# Patient Record
Sex: MALE | Race: WHITE | HISPANIC OR LATINO | Employment: FULL TIME | URBAN - METROPOLITAN AREA
[De-identification: names, ages, dates, MRNs, and addresses within clinical notes are randomized per-mention and may not be internally consistent; named-entity substitution may affect disease eponyms.]

---

## 2017-09-27 ENCOUNTER — OFFICE VISIT (OUTPATIENT)
Dept: MEDICAL GROUP | Facility: MEDICAL CENTER | Age: 32
End: 2017-09-27
Payer: COMMERCIAL

## 2017-09-27 VITALS
RESPIRATION RATE: 16 BRPM | TEMPERATURE: 98.3 F | BODY MASS INDEX: 28.14 KG/M2 | HEIGHT: 69 IN | SYSTOLIC BLOOD PRESSURE: 130 MMHG | DIASTOLIC BLOOD PRESSURE: 80 MMHG | OXYGEN SATURATION: 97 % | WEIGHT: 190 LBS | HEART RATE: 81 BPM

## 2017-09-27 DIAGNOSIS — J06.9 VIRAL URI: ICD-10-CM

## 2017-09-27 DIAGNOSIS — Z23 NEED FOR MMR VACCINE: ICD-10-CM

## 2017-09-27 DIAGNOSIS — Z00.00 ANNUAL PHYSICAL EXAM: ICD-10-CM

## 2017-09-27 PROCEDURE — 90707 MMR VACCINE SC: CPT | Performed by: NURSE PRACTITIONER

## 2017-09-27 PROCEDURE — 99999 PR NO CHARGE: CPT | Performed by: NURSE PRACTITIONER

## 2017-09-27 PROCEDURE — 90471 IMMUNIZATION ADMIN: CPT | Performed by: NURSE PRACTITIONER

## 2017-09-27 PROCEDURE — 99385 PREV VISIT NEW AGE 18-39: CPT | Mod: 25 | Performed by: NURSE PRACTITIONER

## 2017-09-27 RX ORDER — LORATADINE 10 MG/1
10 TABLET ORAL DAILY
Qty: 30 TAB | Refills: 3 | Status: SHIPPED | OUTPATIENT
Start: 2017-09-27

## 2017-09-27 RX ORDER — FLUTICASONE PROPIONATE 50 MCG
1 SPRAY, SUSPENSION (ML) NASAL DAILY
Qty: 16 G | Refills: 2 | Status: SHIPPED | OUTPATIENT
Start: 2017-09-27

## 2017-09-27 NOTE — LETTER
[facility]  RAY Low  [address]  [place] [address]  Fax: [phone]   Authorization for Release/Disclosure of   Protected Health Information   Name: MARIELENA SANDERS : 1985 SSN: [id number]   Address: [address] [place] [id number] Phone:    [phone] (home)    I authorize the entity listed below to release/disclose the PHI below to:   [facility]/RAY Low and RAY Low   Provider or Entity Name:     Address   City, [facility], [facility]  [address] [place] [address]    Phone:  [phone]      Fax:  [phone]      Reason for request: continuity of care   Information to be released:    [  ] LAST COLONOSCOPY,  including any PATH REPORT and follow-up  [  ] LAST FIT/COLOGUARD RESULT [  ] LAST DEXA  [  ] LAST MAMMOGRAM  [  ] LAST PAP  [XX] LAST LABS [  ] RETINA EXAM REPORT  [XX] IMMUNIZATION RECORDS  [XX] Release all info      [  ] Check here and initial the line next to each item to release ALL health information INCLUDING  _____ Care and treatment for drug and / or alcohol abuse  _____ HIV testing, infection status, or AIDS  _____ Genetic Testing    DATES OF SERVICE OR TIME PERIOD TO BE DISCLOSED: _____________  I understand and acknowledge that:  * This Authorization may be revoked at any time by you in writing, except if your health information has already been used or disclosed.  * Your health information that will be used or disclosed as a result of you signing this authorization could be re-disclosed by the recipient. If this occurs, your re-disclosed health information may no longer be protected by State or Federal laws.  * You may refuse to sign this Authorization. Your refusal will not affect your ability to obtain treatment.  * This Authorization becomes effective upon signing and will  on (date) __________.      If no date is indicated, this Authorization will  one (1) year from the  signature date.    Name: Fedenirmala Zepeda    Signature:   Date:     9/27/2017       PLEASE FAX REQUESTED RECORDS BACK TO: [phone]

## 2017-09-28 NOTE — ASSESSMENT & PLAN NOTE
Last time being seen by a provider was 2007 when he had his appendectomy. He also recently had lab work done for immigration. He is requesting an MMR vaccination today for immigration as well. He eats fast food 3 times per week, eats mostly meat, rice, beans, tortillas, vegetables only 1-2 times per week, he does not do any physical activity currently, he used to play soccer. He currently has a cold, denies any fevers, chills.

## 2017-09-28 NOTE — PROGRESS NOTES
Min Zepeda is a 32 y.o. male here for to establish care and to receive the MMR vaccination.    HPI:    Annual physical exam  Last time being seen by a provider was 2007 when he had his appendectomy. He also recently had lab work done for immigration. He is requesting an MMR vaccination today for immigration as well. He eats fast food 3 times per week, eats mostly meat, rice, beans, tortillas, vegetables only 1-2 times per week, he does not do any physical activity currently, he used to play soccer. He currently has a cold, denies any fevers, chills.     Viral URI  Symptoms started yesterday, include sinus congestion, cough, sneezing. Denies fevers, chills. Has been taking OTC ibuprofen 400mg twice daily for symptoms.     Current medicines (including changes today)  Current Outpatient Prescriptions   Medication Sig Dispense Refill   • fluticasone (FLONASE) 50 MCG/ACT nasal spray Spray 1 Spray in nose every day. 16 g 2   • loratadine (CLARITIN) 10 MG Tab Take 1 Tab by mouth every day. 30 Tab 3     No current facility-administered medications for this visit.      He  has no past medical history on file.  He  has a past surgical history that includes appendectomy (12/2006).  Social History   Substance Use Topics   • Smoking status: Former Smoker     Packs/day: 0.25     Years: 6.00     Types: Cigarettes     Quit date: 9/27/2006   • Smokeless tobacco: Never Used   • Alcohol use 4.2 oz/week     7 Cans of beer per week      Comment: 7/week     Social History     Social History Narrative   • No narrative on file     Family History   Problem Relation Age of Onset   • Hyperlipidemia Father    • Kidney Disease Brother      s/p transplant     Family Status   Relation Status   • Mother Alive   • Father Alive   • Sister Alive   • Brother Alive         ROS  No chest pain, no abdominal pain, no rash, no fever  All other systems reviewed and are negative      Objective:     Blood pressure 130/80, pulse 81,  "temperature 36.8 °C (98.3 °F), resp. rate 16, height 1.753 m (5' 9\"), weight 86.2 kg (190 lb), SpO2 97 %. Body mass index is 28.06 kg/m².  Physical Exam:    Constitutional: Alert, no distress.  Skin: Warm, dry, good turgor, no rashes in visible areas.  Eye: Equal, round and reactive, conjunctiva clear, lids normal.  ENMT: Lips without lesions, good dentition, +Pharyngeal erythema. + Erythema and edema of nasal turbinates bilaterally with clear drainage present.  Neck: Trachea midline, no masses, no thyromegaly. No cervical or supraclavicular lymphadenopathy.  Respiratory: Unlabored respiratory effort, lungs clear to auscultation, no wheezes, no ronchi.  Cardiovascular: Normal S1, S2, no murmur, no edema.  Abdomen: Soft, non-tender, no masses, no hepatosplenomegaly.  Psych: Alert and oriented x3, normal affect and mood.        Assessment and Plan:   The following treatment plan was discussed    1. Annual physical exam  Healthy-appearing 32-year-old male. Has not seen a provider since 2007 when he had his appendectomy. He adheres to a poor diet and is not physically active. Patient and I discussed the importance of lifestyle changes, with particular emphasis on increasing plant-based nutrition (for the purposes of weight loss, general health), as well as cardiovascular exercise, proper sleep, and stress management.  Patient verbalized understanding.     The patient has a needle phobia. He recently had labs done for immigration, we will try to get these records. I have ordered the labs below and will notify patient if these are still needed once we receive records.    Encouraged patient to continue following up to prevent development of chronic diseases in the future, also encouraged him to get flu shot however he declined.    - CBC WITH DIFFERENTIAL; Future  - COMP METABOLIC PANEL; Future  - HEMOGLOBIN A1C; Future  - LIPID PROFILE; Future  - TSH WITH REFLEX TO FT4; Future    2. Viral URI  Unstable.   Stop Advil. Start " Flonase and Claritin daily.  - fluticasone (FLONASE) 50 MCG/ACT nasal spray; Spray 1 Spray in nose every day.  Dispense: 16 g; Refill: 2  - loratadine (CLARITIN) 10 MG Tab; Take 1 Tab by mouth every day.  Dispense: 30 Tab; Refill: 3    Treatments advised today in addition to orders above  include: Nasal decongestant, sinus rinse or nasal saline, OTC cough/cold product of patient's choice PRN, OTC antihistamines PRN, prescription for symptomatic treatment as written and fluids and rest    3. Need for MMR vaccine  Needed for immigration.  Vaccination given in office.  - MMR VACCINE SQ    Followup for worsening symptoms, difficulty breathing, lack of expected recovery, or should new symptoms or problems arise.      Records requested.  Followup: Return in about 3 months (around 12/27/2017), or if symptoms worsen or fail to improve, for Short.    I have placed the below orders and discussed them with an approved delegating provider. The MA is performing the below orders under the direction of Dr. Gilbert

## 2017-09-28 NOTE — ASSESSMENT & PLAN NOTE
Symptoms started yesterday, include sinus congestion, cough, sneezing. Denies fevers, chills. Has been taking OTC ibuprofen 400mg twice daily for symptoms.

## 2017-09-29 NOTE — PROGRESS NOTES
I have reviewed note from JAHAIRA Melissa and I agree with assessment and plan.  Rafy Gilbert M.D.

## 2018-06-26 ENCOUNTER — OFFICE VISIT (OUTPATIENT)
Dept: MEDICAL GROUP | Facility: MEDICAL CENTER | Age: 33
End: 2018-06-26
Payer: COMMERCIAL

## 2018-06-26 VITALS
HEIGHT: 69 IN | WEIGHT: 190 LBS | BODY MASS INDEX: 28.14 KG/M2 | HEART RATE: 89 BPM | DIASTOLIC BLOOD PRESSURE: 80 MMHG | TEMPERATURE: 99 F | SYSTOLIC BLOOD PRESSURE: 118 MMHG | OXYGEN SATURATION: 96 %

## 2018-06-26 DIAGNOSIS — R21 RASH: ICD-10-CM

## 2018-06-26 DIAGNOSIS — K22.89 ESOPHAGEAL PAIN: ICD-10-CM

## 2018-06-26 DIAGNOSIS — K21.00 GASTROESOPHAGEAL REFLUX DISEASE WITH ESOPHAGITIS: ICD-10-CM

## 2018-06-26 DIAGNOSIS — R13.19 OTHER DYSPHAGIA: ICD-10-CM

## 2018-06-26 PROCEDURE — 99214 OFFICE O/P EST MOD 30 MIN: CPT | Performed by: NURSE PRACTITIONER

## 2018-06-26 RX ORDER — SUCRALFATE 1 G/1
1 TABLET ORAL
Qty: 120 TAB | Refills: 0 | Status: SHIPPED | OUTPATIENT
Start: 2018-06-26

## 2018-06-26 RX ORDER — OMEPRAZOLE 20 MG/1
20 CAPSULE, DELAYED RELEASE ORAL DAILY
Qty: 30 CAP | Refills: 0 | Status: SHIPPED | OUTPATIENT
Start: 2018-06-26

## 2018-06-26 ASSESSMENT — PATIENT HEALTH QUESTIONNAIRE - PHQ9: CLINICAL INTERPRETATION OF PHQ2 SCORE: 0

## 2018-06-27 NOTE — PROGRESS NOTES
Subjective:     Min Zepeda is a 33 y.o. male who presents with penile rash.    HPI:   Seen in f/u for red rash on pubic area. He had red itching bumps.  Used otc lotrimin with complete resolution.    He is having difficulty swallowing.  He feels that he has food getting stuck in his throat.  He also has heart burn.  He was eating last week and felt his food getting stuck.  He also has ot drink and eat slowly or it will cause pain in epigastric area, esophagus and throat.  Has had to stop acidic foods.  No black tarry stools.      Patient Active Problem List    Diagnosis Date Noted   • Annual physical exam 09/27/2017   • Viral URI 09/27/2017       Current medicines (including changes today)  Current Outpatient Prescriptions   Medication Sig Dispense Refill   • omeprazole (PRILOSEC) 20 MG delayed-release capsule Take 1 Cap by mouth every day. 30 Cap 0   • sucralfate (CARAFATE) 1 GM Tab Take 1 Tab by mouth 4 Times a Day,Before Meals and at Bedtime. 120 Tab 0   • fluticasone (FLONASE) 50 MCG/ACT nasal spray Spray 1 Spray in nose every day. 16 g 2   • loratadine (CLARITIN) 10 MG Tab Take 1 Tab by mouth every day. 30 Tab 3     No current facility-administered medications for this visit.        No Known Allergies    ROS  Constitutional: Negative. Negative for fever, chills, weight loss, malaise/fatigue and diaphoresis.   HENT: Negative. Negative for hearing loss, ear pain, nosebleeds, congestion, sore throat, neck pain, tinnitus and ear discharge.   Respiratory: Negative. Negative for cough, hemoptysis, sputum production, shortness of breath, wheezing and stridor.   Cardiovascular: Negative. Negative for chest pain, palpitations, orthopnea, claudication, leg swelling and PND.   Gastrointestinal: Denies nausea, vomiting, diarrhea, constipation, heartburn, melena or hematochezia.  Genitourinary: Denies dysuria, hematuria, urinary incontinence, frequency or urgency.        Objective:     Blood pressure  "118/80, pulse 89, temperature 37.2 °C (99 °F), height 1.753 m (5' 9\"), weight 86.2 kg (190 lb), SpO2 96 %. Body mass index is 28.06 kg/m².    Physical Exam:  Physical Exam   Vitals reviewed.  Constitutional: oriented to person, place, and time. appears well-developed and well-nourished. No distress.   Cardiovascular: Normal rate, regular rhythm, normal heart sounds and intact distal pulses.  Exam reveals no gallop and no friction rub.  No murmur heard.  No carotid bruits.   Pulmonary/Chest: Effort normal and breath sounds normal. No stridor. No respiratory distress. no wheezes or rales. exhibits no tenderness.   Musculoskeletal: Normal range of motion. exhibits no edema. marilee pedal pulses 2+.  Lymphadenopathy: no cervical or supraclavicular adenopathy.   Abd:  No CVAT,  Soft,  Bs noted in all quadrants.  No HSM.  No abdominal tenderness.  Penile exam wnl.  No rash noted  Neurological: alert and oriented to person, place, and time. exhibits normal muscle tone. Coordination normal.   Skin: Skin is warm and dry. no diaphoresis.   Psychiatric: normal mood and affect. behavior is normal.           Assessment and Plan:     The following treatment plan was discussed:    1. Gastroesophageal reflux disease with esophagitis  omeprazole (PRILOSEC) 20 MG delayed-release capsule    sucralfate (CARAFATE) 1 GM Tab    REFERRAL TO GASTROENTEROLOGY    H. PYLORI, UREA BREATH TEST, ADULT    use prilosec and carafate daily x 30 days.  check h pylori.  f/u with pt with results.  refer to GI - to see them if not improved   2. Esophageal pain  omeprazole (PRILOSEC) 20 MG delayed-release capsule    sucralfate (CARAFATE) 1 GM Tab    REFERRAL TO GASTROENTEROLOGY    H. PYLORI, UREA BREATH TEST, ADULT    chew food well and eat slowly.    3. Other dysphagia  omeprazole (PRILOSEC) 20 MG delayed-release capsule    sucralfate (CARAFATE) 1 GM Tab    REFERRAL TO GASTROENTEROLOGY    H. PYLORI, UREA BREATH TEST, ADULT   4. Rash      resolved with " lotrimin         Followup: Return if symptoms worsen or fail to improve.

## 2020-07-07 ENCOUNTER — TELEPHONE (OUTPATIENT)
Dept: MEDICAL GROUP | Facility: MEDICAL CENTER | Age: 35
End: 2020-07-07

## 2020-07-07 DIAGNOSIS — Z11.59 SPECIAL SCREENING EXAMINATION FOR VIRAL DISEASE: ICD-10-CM

## 2020-07-14 ENCOUNTER — APPOINTMENT (OUTPATIENT)
Dept: LAB | Facility: MEDICAL CENTER | Age: 35
End: 2020-07-14
Attending: NURSE PRACTITIONER
Payer: COMMERCIAL

## 2021-04-06 ENCOUNTER — IMMUNIZATION (OUTPATIENT)
Dept: FAMILY PLANNING/WOMEN'S HEALTH CLINIC | Facility: IMMUNIZATION CENTER | Age: 36
End: 2021-04-06
Payer: COMMERCIAL

## 2021-04-06 DIAGNOSIS — Z23 ENCOUNTER FOR VACCINATION: Primary | ICD-10-CM

## 2021-04-06 PROCEDURE — 91300 PFIZER SARS-COV-2 VACCINE: CPT

## 2021-04-06 PROCEDURE — 0001A PFIZER SARS-COV-2 VACCINE: CPT

## 2021-04-29 ENCOUNTER — IMMUNIZATION (OUTPATIENT)
Dept: FAMILY PLANNING/WOMEN'S HEALTH CLINIC | Facility: IMMUNIZATION CENTER | Age: 36
End: 2021-04-29
Attending: INTERNAL MEDICINE
Payer: COMMERCIAL

## 2021-04-29 DIAGNOSIS — Z23 ENCOUNTER FOR VACCINATION: Primary | ICD-10-CM

## 2021-04-29 PROCEDURE — 91300 PFIZER SARS-COV-2 VACCINE: CPT

## 2021-04-29 PROCEDURE — 0002A PFIZER SARS-COV-2 VACCINE: CPT

## 2023-04-23 ENCOUNTER — HOSPITAL ENCOUNTER (EMERGENCY)
Facility: MEDICAL CENTER | Age: 38
End: 2023-04-23
Attending: EMERGENCY MEDICINE
Payer: COMMERCIAL

## 2023-04-23 ENCOUNTER — APPOINTMENT (OUTPATIENT)
Dept: RADIOLOGY | Facility: MEDICAL CENTER | Age: 38
End: 2023-04-23
Attending: EMERGENCY MEDICINE
Payer: COMMERCIAL

## 2023-04-23 VITALS
HEIGHT: 67 IN | BODY MASS INDEX: 30.61 KG/M2 | SYSTOLIC BLOOD PRESSURE: 141 MMHG | DIASTOLIC BLOOD PRESSURE: 83 MMHG | TEMPERATURE: 98 F | WEIGHT: 195 LBS | HEART RATE: 100 BPM | OXYGEN SATURATION: 95 % | RESPIRATION RATE: 16 BRPM

## 2023-04-23 DIAGNOSIS — S82.202A TIBIA/FIBULA FRACTURE, LEFT, CLOSED, INITIAL ENCOUNTER: ICD-10-CM

## 2023-04-23 DIAGNOSIS — S82.402A TIBIA/FIBULA FRACTURE, LEFT, CLOSED, INITIAL ENCOUNTER: ICD-10-CM

## 2023-04-23 DIAGNOSIS — S82.892A CLOSED FRACTURE OF LEFT ANKLE, INITIAL ENCOUNTER: ICD-10-CM

## 2023-04-23 PROCEDURE — 73590 X-RAY EXAM OF LOWER LEG: CPT | Mod: LT

## 2023-04-23 PROCEDURE — 700102 HCHG RX REV CODE 250 W/ 637 OVERRIDE(OP): Performed by: EMERGENCY MEDICINE

## 2023-04-23 PROCEDURE — A9270 NON-COVERED ITEM OR SERVICE: HCPCS | Performed by: EMERGENCY MEDICINE

## 2023-04-23 PROCEDURE — 73610 X-RAY EXAM OF ANKLE: CPT | Mod: LT

## 2023-04-23 PROCEDURE — 99284 EMERGENCY DEPT VISIT MOD MDM: CPT

## 2023-04-23 RX ORDER — OXYCODONE HYDROCHLORIDE AND ACETAMINOPHEN 5; 325 MG/1; MG/1
2 TABLET ORAL ONCE
Status: COMPLETED | OUTPATIENT
Start: 2023-04-23 | End: 2023-04-23

## 2023-04-23 RX ORDER — OXYCODONE HYDROCHLORIDE AND ACETAMINOPHEN 5; 325 MG/1; MG/1
1 TABLET ORAL EVERY 6 HOURS PRN
Qty: 15 TABLET | Refills: 0 | Status: SHIPPED | OUTPATIENT
Start: 2023-04-23 | End: 2023-04-28

## 2023-04-23 RX ADMIN — OXYCODONE AND ACETAMINOPHEN 2 TABLET: 5; 325 TABLET ORAL at 14:27

## 2023-04-23 NOTE — ED PROVIDER NOTES
"ED Provider Note    CHIEF COMPLAINT  Chief Complaint   Patient presents with    Leg Pain     Pt slipped and fell during a soccer game and heard a \"pop\" in left leg. Pt complaining of pain to left calf. Pt having swelling in left ankle as well. CMS intact.        EXTERNAL RECORDS REVIEWED  None    HPI/ROS  LIMITATION TO HISTORY   None  OUTSIDE HISTORIAN(S):  Wife provided additional history    Min Zepeda is a 37 y.o. male who presents for evaluation of acute left lower extremity injury.  The patient was playing soccer, he had an abnormal fall and felt a popping sensation in his left leg.  He experienced severe 10 out of 10 pain in his entire left leg.  No injury to the head neck chest abdomen or pelvis.  Patient is an otherwise healthy 37-year-old.  Only surgical history includes appendectomy.  Injury occurred within the last hour.  He was unable to bear weight.  No numbness weakness or tingling.    PAST MEDICAL HISTORY       SURGICAL HISTORY   has a past surgical history that includes appendectomy (2006).    FAMILY HISTORY  Family History   Problem Relation Age of Onset    Hyperlipidemia Father     Kidney Disease Brother         s/p transplant       SOCIAL HISTORY  Social History     Tobacco Use    Smoking status: Former     Packs/day: 0.25     Years: 6.00     Pack years: 1.50     Types: Cigarettes     Quit date: 2006     Years since quittin.5    Smokeless tobacco: Never   Substance and Sexual Activity    Alcohol use: Yes     Alcohol/week: 4.2 oz     Types: 7 Cans of beer per week     Comment: 7/week    Drug use: No     Comment: Past methamphetamine use for 7 months-quit     Sexual activity: Yes     Partners: Female     Birth control/protection: I.U.D.       CURRENT MEDICATIONS  Home Medications       Reviewed by Alcira Jarquin R.N. (Registered Nurse) on 23 at 1354  Med List Status: Not Addressed     Medication Last Dose Status   fluticasone (FLONASE) 50 MCG/ACT nasal " "spray  Active   loratadine (CLARITIN) 10 MG Tab  Active   omeprazole (PRILOSEC) 20 MG delayed-release capsule  Active   sucralfate (CARAFATE) 1 GM Tab  Active                    ALLERGIES  No Known Allergies    PHYSICAL EXAM  VITAL SIGNS: /86   Pulse (!) 106   Temp 36.5 °C (97.7 °F) (Temporal)   Resp 16   Ht 1.702 m (5' 7\")   Wt 88.5 kg (195 lb)   SpO2 98%   BMI 30.54 kg/m²    Pulse ox interpretation: I interpret this pulse ox as normal.  Constitutional: Alert and oriented x 3, moderate distress  HEENT: Atraumatic normocephalic, pupils are equal round reactive to light extraocular movements are intact. The nares is clear, external ears are normal, mouth shows moist mucous membranes normal dentition for age  Neck: Supple, no JVD no tracheal deviation  Cardiovascular: Regular rate and rhythm no murmur rub or gallop 2+ pulses peripherally x4  Thorax & Lungs: No respiratory distress, no wheezes rales or rhonchi, No chest tenderness.   GI: Soft nontender nondistended positive bowel sounds, no peritoneal signs  Skin: Warm dry no acute rash or lesion  Musculoskeletal: Left lower extremity exam is notable for tenderness noted over the lateral aspect of the leg.  Anterior and lateral compartments appear to be soft.  There is diffuse circumferential swelling in the ankle with particular tenderness overlying the posterior malleolus.  No midfoot instability or tenderness.  Neurovascular exam of the foot is normal.  Capillary refill is normal dorsalis pedal pulses 2+.    Neurologic: Cranial nerves III through XII are grossly intact no sensory deficit no cerebellar dysfunction   Psychiatric: Appropriate affect for situation at this time          DIAGNOSTIC STUDIES / PROCEDURES    RADIOLOGY  I have independently interpreted the diagnostic imaging associated with this visit and am waiting the final reading from the radiologist.   My preliminary interpretation is as follows: Posterior malleoli are fracture with " obliquely slightly displaced proximal fibular fracture  Radiologist interpretation:   DX-ANKLE 3+ VIEWS LEFT   Final Result      Posterior malleolar fracture, only seen on the lateral view. No medial or lateral malleolar fractures.      DX-TIBIA AND FIBULA LEFT   Final Result      1.  Obliquely oriented proximal fibular diaphyseal fracture.   2.  Posterior malleolar fracture, as seen on the ankle radiograph.          COURSE & MEDICAL DECISION MAKING    ED Observation Status? Yes; I am placing the patient in to an observation status due to a diagnostic uncertainty as well as therapeutic intensity. Patient placed in observation status at 1:26 PM, 4/23/2023.     Observation plan is as follows: Administer pain medication perform diagnostic radiographs.  Perform serial neurovascular exams and compartment exams of the left lower extremity.  Discussed plan of care with orthopedics    Upon Reevaluation, the patient's condition has: Improved; and will be discharged.    Patient discharged from ED Observation status at 1530 (Time) 4/23/23 (Date).     INITIAL ASSESSMENT, COURSE AND PLAN  Care Narrative:    This is a very pleasant 37-year-old gentleman who presents here after an acute injury to the left lower extremity.  He was given oral Percocet for pain.  I was concerned about possible fractures and subsequent posterior malleolus fracture and proximal spiral fibular fracture were diagnosed today.  I performed serial compartment and neurovascular exams and there is no evidence of compartment syndrome.  His pain was improved after treatment with Percocet.  Reviewed the case and plan of care with Dr. Meyer with orthopedics.  He feels that a tall orthopedic boot and crutches with nonweightbearing status with urgent follow-up as clinically indicated.  I will provide the patient a brief prescription for opiates.  Reviewed his profile in the  website and there is no history of opioid abuse or addiction.      ADDITIONAL PROBLEM  LIST    DISPOSITION AND DISCUSSIONS  I have discussed management of the patient with the following physicians and WILMER's: Discussed plan of care with orthopedic attending    Discussion of management with other QHP or appropriate source(s): None    Escalation of care considered, and ultimately not performed:IV fluids and blood analysis    Barriers to care at this time, including but not limited to: Patient does not have established PCP.     Decision tools and prescription drugs considered including, but not limited to: Patient will be prescribed brief opioid therapy    FINAL DIAGNOSIS  1. Closed fracture of left ankle, initial encounter  oxyCODONE-acetaminophen (PERCOCET) 5-325 MG Tab      2. Tibia/fibula fracture, left, closed, initial encounter                 Electronically signed by: Tejinder Gates M.D., 4/23/2023 2:52 PM

## 2023-04-23 NOTE — ED TRIAGE NOTES
"Chief Complaint   Patient presents with    Leg Pain     Pt slipped and fell during a soccer game and heard a \"pop\" in left leg. Pt complaining of pain to left calf. Pt having swelling in left ankle as well. CMS intact.        Swelling noted to left lower leg. Pt states he feels like when he pushes on the outside of his calf he can feel the bone moving.     /86   Pulse (!) 106   Temp 36.5 °C (97.7 °F) (Temporal)   Resp 16   Ht 1.702 m (5' 7\")   Wt 88.5 kg (195 lb)   SpO2 98%   BMI 30.54 kg/m²     "

## 2023-04-23 NOTE — ED NOTES
Discharge paperwork given to pt, all questions answered, all belongings accounted for, pt ambulated with aid of crutches to ER exit

## 2023-04-24 NOTE — H&P (VIEW-ONLY)
Subjective   Patient ID:  Min Zepeda is a 37 y.o. male.    Chief Complaint:  Fracture of the Left Ankle    Last Surgery: No surgery found on No surgery found    HPI Min is a pleasant 37-year-old gentleman who was playing soccer and sustained a twisting injury to his left ankle.  He presented to [facility] and has a [place] as well as a posterior malleolar fracture.  He is here for consideration of surgical management.  Denies numbness or tingling.  He works in construction.    Review of Systems negative    Objective   Ortho Exam  Alert and oriented no apparent distress.  Chest breathing comfortably, heart regular rate and rhythm.  Very pleasant conversation.  Circumferential moderate to severe swelling around his left ankle.  Neurovascularly intact throughout.  Skin is intact.    Last Imaging Result(s):   Ankle and tib-fib x-rays from [facility] show a proximal fibula fracture without significant shortening, long oblique.  Posterior malleolar fracture with some displacement.  I do not see a definite osteochondral defect.    Assessment & Plan   Encounter Diagnoses:   Closed fracture of ankle, trimalleolar, left, initial encounter    Ankle syndesmosis disruption, left, initial encounter    Orders Placed This Encounter    Surgical Case Request: ORIF, ANKLE     Min is a pleasant 37-year-old gentleman with a trimalleolar equivalent ankle fracture with a posterior malleolar fragment, deltoid disruption, proximal fibula fracture.  He is indicated for open reduction internal fixation as well as fixation of his syndesmosis.  We will arthroscopically evaluate and debride the joint as well as use arthroscope to assist in reduction of the syndesmosis.  I like him to be touchdown weightbearing only until surgery.  I like him to wait for about a week for soft tissue rest and I have discussed this with him and his wife.  He will be weightbearing as tolerated in a boot postoperatively.  I filled out a  scheduling form and look forward to seeing him on a scheduled date.  Return for Post-Op, Imaging.

## 2023-04-25 PROBLEM — S82.852D TRIMALLEOLAR FRACTURE OF ANKLE, CLOSED, LEFT, WITH ROUTINE HEALING, SUBSEQUENT ENCOUNTER: Status: ACTIVE | Noted: 2023-04-25

## 2023-05-01 ENCOUNTER — PRE-ADMISSION TESTING (OUTPATIENT)
Dept: ADMISSIONS | Facility: MEDICAL CENTER | Age: 38
End: 2023-05-01
Attending: ORTHOPAEDIC SURGERY
Payer: COMMERCIAL

## 2023-05-01 RX ORDER — OXYCODONE AND ACETAMINOPHEN 10; 325 MG/1; MG/1
1 TABLET ORAL EVERY 4 HOURS PRN
COMMUNITY

## 2023-05-02 ENCOUNTER — ANESTHESIA EVENT (OUTPATIENT)
Dept: SURGERY | Facility: MEDICAL CENTER | Age: 38
End: 2023-05-02
Payer: COMMERCIAL

## 2023-05-02 ENCOUNTER — ANESTHESIA (OUTPATIENT)
Dept: SURGERY | Facility: MEDICAL CENTER | Age: 38
End: 2023-05-02
Payer: COMMERCIAL

## 2023-05-02 ENCOUNTER — HOSPITAL ENCOUNTER (OUTPATIENT)
Facility: MEDICAL CENTER | Age: 38
End: 2023-05-02
Attending: ORTHOPAEDIC SURGERY | Admitting: ORTHOPAEDIC SURGERY
Payer: COMMERCIAL

## 2023-05-02 ENCOUNTER — APPOINTMENT (OUTPATIENT)
Dept: RADIOLOGY | Facility: MEDICAL CENTER | Age: 38
End: 2023-05-02
Attending: ORTHOPAEDIC SURGERY
Payer: COMMERCIAL

## 2023-05-02 VITALS
SYSTOLIC BLOOD PRESSURE: 110 MMHG | TEMPERATURE: 97.2 F | OXYGEN SATURATION: 93 % | DIASTOLIC BLOOD PRESSURE: 68 MMHG | BODY MASS INDEX: 28.91 KG/M2 | RESPIRATION RATE: 18 BRPM | HEIGHT: 70 IN | HEART RATE: 80 BPM | WEIGHT: 201.94 LBS

## 2023-05-02 DIAGNOSIS — S82.852D TRIMALLEOLAR FRACTURE OF ANKLE, CLOSED, LEFT, WITH ROUTINE HEALING, SUBSEQUENT ENCOUNTER: ICD-10-CM

## 2023-05-02 PROCEDURE — 160035 HCHG PACU - 1ST 60 MINS PHASE I: Performed by: ORTHOPAEDIC SURGERY

## 2023-05-02 PROCEDURE — 700101 HCHG RX REV CODE 250: Performed by: ANESTHESIOLOGY

## 2023-05-02 PROCEDURE — 01480 ANES OPEN PX LOWER L/A/F NOS: CPT | Performed by: ANESTHESIOLOGY

## 2023-05-02 PROCEDURE — 64445 NJX AA&/STRD SCIATIC NRV IMG: CPT | Performed by: ORTHOPAEDIC SURGERY

## 2023-05-02 PROCEDURE — 160029 HCHG SURGERY MINUTES - 1ST 30 MINS LEVEL 4: Performed by: ORTHOPAEDIC SURGERY

## 2023-05-02 PROCEDURE — 700105 HCHG RX REV CODE 258: Performed by: ANESTHESIOLOGY

## 2023-05-02 PROCEDURE — 64447 NJX AA&/STRD FEMORAL NRV IMG: CPT | Mod: 59,LT | Performed by: ANESTHESIOLOGY

## 2023-05-02 PROCEDURE — C1713 ANCHOR/SCREW BN/BN,TIS/BN: HCPCS | Performed by: ORTHOPAEDIC SURGERY

## 2023-05-02 PROCEDURE — 502000 HCHG MISC OR IMPLANTS RC 0278: Performed by: ORTHOPAEDIC SURGERY

## 2023-05-02 PROCEDURE — 160046 HCHG PACU - 1ST 60 MINS PHASE II: Performed by: ORTHOPAEDIC SURGERY

## 2023-05-02 PROCEDURE — 27822 TREATMENT OF ANKLE FRACTURE: CPT | Mod: LT | Performed by: ORTHOPAEDIC SURGERY

## 2023-05-02 PROCEDURE — 700101 HCHG RX REV CODE 250: Performed by: ORTHOPAEDIC SURGERY

## 2023-05-02 PROCEDURE — 27829 TREAT LOWER LEG JOINT: CPT | Mod: LT | Performed by: ORTHOPAEDIC SURGERY

## 2023-05-02 PROCEDURE — 64445 NJX AA&/STRD SCIATIC NRV IMG: CPT | Mod: 59,LT | Performed by: ANESTHESIOLOGY

## 2023-05-02 PROCEDURE — 29898 ANKLE ARTHROSCOPY/SURGERY: CPT | Mod: LT | Performed by: ORTHOPAEDIC SURGERY

## 2023-05-02 PROCEDURE — 27822 TREATMENT OF ANKLE FRACTURE: CPT | Mod: ASROC,LT | Performed by: NURSE PRACTITIONER

## 2023-05-02 PROCEDURE — 160002 HCHG RECOVERY MINUTES (STAT): Performed by: ORTHOPAEDIC SURGERY

## 2023-05-02 PROCEDURE — 73590 X-RAY EXAM OF LOWER LEG: CPT | Mod: LT

## 2023-05-02 PROCEDURE — 64447 NJX AA&/STRD FEMORAL NRV IMG: CPT | Performed by: ORTHOPAEDIC SURGERY

## 2023-05-02 PROCEDURE — 700111 HCHG RX REV CODE 636 W/ 250 OVERRIDE (IP): Performed by: ORTHOPAEDIC SURGERY

## 2023-05-02 PROCEDURE — 160009 HCHG ANES TIME/MIN: Performed by: ORTHOPAEDIC SURGERY

## 2023-05-02 PROCEDURE — 160041 HCHG SURGERY MINUTES - EA ADDL 1 MIN LEVEL 4: Performed by: ORTHOPAEDIC SURGERY

## 2023-05-02 PROCEDURE — 29891 ARTHR ANK OSTCHN DF TAL&/TIB: CPT | Mod: ASROC,LT | Performed by: NURSE PRACTITIONER

## 2023-05-02 PROCEDURE — 160048 HCHG OR STATISTICAL LEVEL 1-5: Performed by: ORTHOPAEDIC SURGERY

## 2023-05-02 PROCEDURE — 29891 ARTHR ANK OSTCHN DF TAL&/TIB: CPT | Mod: LT | Performed by: ORTHOPAEDIC SURGERY

## 2023-05-02 PROCEDURE — 700105 HCHG RX REV CODE 258: Performed by: ORTHOPAEDIC SURGERY

## 2023-05-02 PROCEDURE — 700111 HCHG RX REV CODE 636 W/ 250 OVERRIDE (IP): Performed by: ANESTHESIOLOGY

## 2023-05-02 PROCEDURE — 27829 TREAT LOWER LEG JOINT: CPT | Mod: ASROC,LT | Performed by: NURSE PRACTITIONER

## 2023-05-02 PROCEDURE — 73610 X-RAY EXAM OF ANKLE: CPT | Mod: LT

## 2023-05-02 PROCEDURE — 160025 RECOVERY II MINUTES (STATS): Performed by: ORTHOPAEDIC SURGERY

## 2023-05-02 PROCEDURE — 29898 ANKLE ARTHROSCOPY/SURGERY: CPT | Mod: ASROC,LT | Performed by: NURSE PRACTITIONER

## 2023-05-02 DEVICE — IMPLANTABLE DEVICE: Type: IMPLANTABLE DEVICE | Site: ANKLE | Status: FUNCTIONAL

## 2023-05-02 RX ORDER — DIPHENHYDRAMINE HYDROCHLORIDE 50 MG/ML
12.5 INJECTION INTRAMUSCULAR; INTRAVENOUS
Status: DISCONTINUED | OUTPATIENT
Start: 2023-05-02 | End: 2023-05-02 | Stop reason: HOSPADM

## 2023-05-02 RX ORDER — OXYCODONE HCL 5 MG/5 ML
10 SOLUTION, ORAL ORAL
Status: DISCONTINUED | OUTPATIENT
Start: 2023-05-02 | End: 2023-05-02 | Stop reason: HOSPADM

## 2023-05-02 RX ORDER — DEXAMETHASONE SODIUM PHOSPHATE 4 MG/ML
INJECTION, SOLUTION INTRA-ARTICULAR; INTRALESIONAL; INTRAMUSCULAR; INTRAVENOUS; SOFT TISSUE PRN
Status: DISCONTINUED | OUTPATIENT
Start: 2023-05-02 | End: 2023-05-02 | Stop reason: SURG

## 2023-05-02 RX ORDER — SODIUM CHLORIDE, SODIUM LACTATE, POTASSIUM CHLORIDE, CALCIUM CHLORIDE 600; 310; 30; 20 MG/100ML; MG/100ML; MG/100ML; MG/100ML
INJECTION, SOLUTION INTRAVENOUS CONTINUOUS
Status: DISCONTINUED | OUTPATIENT
Start: 2023-05-02 | End: 2023-05-02 | Stop reason: HOSPADM

## 2023-05-02 RX ORDER — MIDAZOLAM HYDROCHLORIDE 1 MG/ML
1 INJECTION INTRAMUSCULAR; INTRAVENOUS
Status: DISCONTINUED | OUTPATIENT
Start: 2023-05-02 | End: 2023-05-02 | Stop reason: HOSPADM

## 2023-05-02 RX ORDER — HALOPERIDOL 5 MG/ML
1 INJECTION INTRAMUSCULAR
Status: DISCONTINUED | OUTPATIENT
Start: 2023-05-02 | End: 2023-05-02 | Stop reason: HOSPADM

## 2023-05-02 RX ORDER — MIDAZOLAM HYDROCHLORIDE 1 MG/ML
INJECTION INTRAMUSCULAR; INTRAVENOUS PRN
Status: DISCONTINUED | OUTPATIENT
Start: 2023-05-02 | End: 2023-05-02 | Stop reason: SURG

## 2023-05-02 RX ORDER — HYDRALAZINE HYDROCHLORIDE 20 MG/ML
5 INJECTION INTRAMUSCULAR; INTRAVENOUS
Status: DISCONTINUED | OUTPATIENT
Start: 2023-05-02 | End: 2023-05-02 | Stop reason: HOSPADM

## 2023-05-02 RX ORDER — HYDROMORPHONE HYDROCHLORIDE 1 MG/ML
0.2 INJECTION, SOLUTION INTRAMUSCULAR; INTRAVENOUS; SUBCUTANEOUS
Status: DISCONTINUED | OUTPATIENT
Start: 2023-05-02 | End: 2023-05-02 | Stop reason: HOSPADM

## 2023-05-02 RX ORDER — OXYCODONE HCL 5 MG/5 ML
5 SOLUTION, ORAL ORAL
Status: DISCONTINUED | OUTPATIENT
Start: 2023-05-02 | End: 2023-05-02 | Stop reason: HOSPADM

## 2023-05-02 RX ORDER — LIDOCAINE HYDROCHLORIDE 20 MG/ML
INJECTION, SOLUTION EPIDURAL; INFILTRATION; INTRACAUDAL; PERINEURAL PRN
Status: DISCONTINUED | OUTPATIENT
Start: 2023-05-02 | End: 2023-05-02 | Stop reason: SURG

## 2023-05-02 RX ORDER — MEPERIDINE HYDROCHLORIDE 25 MG/ML
12.5 INJECTION INTRAMUSCULAR; INTRAVENOUS; SUBCUTANEOUS
Status: DISCONTINUED | OUTPATIENT
Start: 2023-05-02 | End: 2023-05-02 | Stop reason: HOSPADM

## 2023-05-02 RX ORDER — CEFAZOLIN SODIUM 1 G/3ML
2 INJECTION, POWDER, FOR SOLUTION INTRAMUSCULAR; INTRAVENOUS ONCE
Status: COMPLETED | OUTPATIENT
Start: 2023-05-02 | End: 2023-05-02

## 2023-05-02 RX ORDER — EPHEDRINE SULFATE 50 MG/ML
INJECTION, SOLUTION INTRAVENOUS PRN
Status: DISCONTINUED | OUTPATIENT
Start: 2023-05-02 | End: 2023-05-02 | Stop reason: SURG

## 2023-05-02 RX ORDER — ONDANSETRON 2 MG/ML
4 INJECTION INTRAMUSCULAR; INTRAVENOUS
Status: DISCONTINUED | OUTPATIENT
Start: 2023-05-02 | End: 2023-05-02 | Stop reason: HOSPADM

## 2023-05-02 RX ORDER — SODIUM CHLORIDE, SODIUM LACTATE, POTASSIUM CHLORIDE, CALCIUM CHLORIDE 600; 310; 30; 20 MG/100ML; MG/100ML; MG/100ML; MG/100ML
INJECTION, SOLUTION INTRAVENOUS
Status: DISCONTINUED | OUTPATIENT
Start: 2023-05-02 | End: 2023-05-02 | Stop reason: SURG

## 2023-05-02 RX ORDER — ONDANSETRON 2 MG/ML
INJECTION INTRAMUSCULAR; INTRAVENOUS PRN
Status: DISCONTINUED | OUTPATIENT
Start: 2023-05-02 | End: 2023-05-02 | Stop reason: SURG

## 2023-05-02 RX ORDER — HYDROMORPHONE HYDROCHLORIDE 1 MG/ML
0.1 INJECTION, SOLUTION INTRAMUSCULAR; INTRAVENOUS; SUBCUTANEOUS
Status: DISCONTINUED | OUTPATIENT
Start: 2023-05-02 | End: 2023-05-02 | Stop reason: HOSPADM

## 2023-05-02 RX ORDER — HYDROMORPHONE HYDROCHLORIDE 1 MG/ML
0.4 INJECTION, SOLUTION INTRAMUSCULAR; INTRAVENOUS; SUBCUTANEOUS
Status: DISCONTINUED | OUTPATIENT
Start: 2023-05-02 | End: 2023-05-02 | Stop reason: HOSPADM

## 2023-05-02 RX ORDER — EPHEDRINE SULFATE 50 MG/ML
5 INJECTION, SOLUTION INTRAVENOUS
Status: DISCONTINUED | OUTPATIENT
Start: 2023-05-02 | End: 2023-05-02 | Stop reason: HOSPADM

## 2023-05-02 RX ADMIN — DEXAMETHASONE SODIUM PHOSPHATE 4 MG: 4 INJECTION, SOLUTION INTRA-ARTICULAR; INTRALESIONAL; INTRAMUSCULAR; INTRAVENOUS; SOFT TISSUE at 07:00

## 2023-05-02 RX ADMIN — MIDAZOLAM HYDROCHLORIDE 2 MG: 1 INJECTION, SOLUTION INTRAMUSCULAR; INTRAVENOUS at 06:58

## 2023-05-02 RX ADMIN — DEXAMETHASONE SODIUM PHOSPHATE 8 MG: 4 INJECTION, SOLUTION INTRA-ARTICULAR; INTRALESIONAL; INTRAMUSCULAR; INTRAVENOUS; SOFT TISSUE at 07:04

## 2023-05-02 RX ADMIN — ROPIVACAINE HYDROCHLORIDE 30 ML: 5 INJECTION, SOLUTION EPIDURAL; INFILTRATION; PERINEURAL at 07:08

## 2023-05-02 RX ADMIN — CEFAZOLIN 2 G: 330 INJECTION, POWDER, FOR SOLUTION INTRAMUSCULAR; INTRAVENOUS at 07:00

## 2023-05-02 RX ADMIN — LIDOCAINE HYDROCHLORIDE 0.5 ML: 10 INJECTION, SOLUTION EPIDURAL; INFILTRATION; INTRACAUDAL; PERINEURAL at 05:48

## 2023-05-02 RX ADMIN — ONDANSETRON 8 MG: 2 INJECTION INTRAMUSCULAR; INTRAVENOUS at 07:54

## 2023-05-02 RX ADMIN — LIDOCAINE HYDROCHLORIDE 90 MG: 20 INJECTION, SOLUTION EPIDURAL; INFILTRATION; INTRACAUDAL at 07:00

## 2023-05-02 RX ADMIN — ROPIVACAINE HYDROCHLORIDE 30 ML: 5 INJECTION, SOLUTION EPIDURAL; INFILTRATION; PERINEURAL at 07:06

## 2023-05-02 RX ADMIN — FENTANYL CITRATE 100 MCG: 50 INJECTION, SOLUTION INTRAMUSCULAR; INTRAVENOUS at 07:00

## 2023-05-02 RX ADMIN — EPHEDRINE SULFATE 10 MG: 50 INJECTION, SOLUTION INTRAVENOUS at 07:02

## 2023-05-02 RX ADMIN — SODIUM CHLORIDE, POTASSIUM CHLORIDE, SODIUM LACTATE AND CALCIUM CHLORIDE: 600; 310; 30; 20 INJECTION, SOLUTION INTRAVENOUS at 05:48

## 2023-05-02 RX ADMIN — PROPOFOL 200 MG: 10 INJECTION, EMULSION INTRAVENOUS at 07:00

## 2023-05-02 RX ADMIN — SODIUM CHLORIDE, POTASSIUM CHLORIDE, SODIUM LACTATE AND CALCIUM CHLORIDE: 600; 310; 30; 20 INJECTION, SOLUTION INTRAVENOUS at 06:57

## 2023-05-02 ASSESSMENT — PAIN DESCRIPTION - PAIN TYPE
TYPE: SURGICAL PAIN

## 2023-05-02 ASSESSMENT — PAIN SCALES - GENERAL: PAIN_LEVEL: 0

## 2023-05-02 NOTE — ANESTHESIA PROCEDURE NOTES
Airway    Date/Time: 5/2/2023 7:00 AM  Performed by: Freddy Waller D.O.  Authorized by: Freddy Waller D.O.     Location:  OR  Urgency:  Elective  Indications for Airway Management:  Anesthesia      Spontaneous Ventilation: absent    Sedation Level:  Deep  Preoxygenated: Yes    Mask Difficulty Assessment:  0 - not attempted  Final Airway Type:  Supraglottic airway  Final Supraglottic Airway:  Standard LMA    SGA Size:  5  Number of Attempts at Approach:  1

## 2023-05-02 NOTE — ANESTHESIA TIME REPORT
Anesthesia Start and Stop Event Times     Date Time Event    5/2/2023 0646 Ready for Procedure     0657 Anesthesia Start     0807 Anesthesia Stop        Responsible Staff  05/02/23    Name Role Begin End    Freddy Waller D.O. Anesth 0657 0807        Overtime Reason:  no overtime (within assigned shift)    Comments:

## 2023-05-02 NOTE — OR NURSING
0855: Report received from VILMA Shah. Patient to Phase II 32 pending transport. Patient provided with water. Patient tolerating PO intake.    0900: Patient to Phase II Rm 32. Left ankle dressing CDI. Vitals taken upon arrival. Plan of care discussed with patient. Patient belongings returned to patient at bedside.    0915: Patient and family updated regarding Plan of care.    0925: Pt discharged home. Discharge instructions given to pt prior to leaving unit including when to see PCP, and new medications if applicable. PIV removed. All questions answered. Verbalized understanding. All belongings with pt when leaving unit via wheelchair with RN.

## 2023-05-02 NOTE — LETTER
April 25, 2023    Patient Name: Min Zepeda  Surgeon Name: Diego Mahmood M.D.  Surgery Facility: [facility] ([address])  Surgery Date: 5/2/2023    The time of your surgery is not final and may change up to and until the day of your surgery. You will be contacted 24-48 hours prior to your surgery date with your check-in and surgery time.    If you will not be at one of the below numbers please call the surgery scheduler at [phone]  Preferred Phone: [phone]    BEFORE YOUR SURGERY   Pre Registration and/or Lab Work must be done within and no earlier than 28 days prior to your surgery date. Please call [facility] at [phone] for an appointment as soon as possible.    The [facility] offers a class for patients undergoing a total hip/knee replacement surgery scheduled at our outpatient surgery center. Information about what to expect for preparation, the day of surgery and recovery will be given. We highly recommend bringing your support for surgery with you to the class, as well as any questions you may have. If you are interested in attending the class, please call [phone] for scheduling.     Pre op Appointment:    Instructions: Bring a list of all medications you are taking including the dosing and frequency.    DAY OF YOUR SURGERY  Nothing to eat or drink after midnight     Refrain from smoking any substance after midnight prior to surgery. Smoking may interfere with the anesthetic and frequently produces nausea during the recovery period.    Continue taking all lifesaving medications. Including the morning of your surgery with small sip of water.    Please do NOT take on the day of surgery:  Diuretics: examples- furosemide (Lasix), spironolactone, hydrochlorothiazide  ACE-inhibitors: examples- lisinopril, ramipril, enalapril  “ARBs”: examples- losartan, Olmesartan, valsartan    Please arrive at the  hospital/surgery center at the check-in time provided.     An adult will need to bring you and take you home after your surgery.     AFTER YOUR SURGERY  Post op Appointment:   Date: 05/15/23   Time: 09:00AM   With: Diego Mahmood MD   Location: [address] [place] [address]    - Therapy- Your first appointment should be 1  week(s) after your surgery. For your convenience we have 4 Physical Therapy locations: [facility], [facility], [place], and [facility]. Call our office ASAP to schedule an appointment at [phone] or take the enclosed Therapy Prescription to a facility of your choice.    TIME OFF WORK  FMLA or Disability forms can be faxed directly to: [phone] or you may drop them off at [address] [place] [address]. Our office charges a $35.00 fee per form. Forms will be completed within 10 business days of drop off and payment received. For the status of your forms you may contact our disability office directly [phone].    MEDICATION INSTRUCTIONS **Please read section completely**    The following medications should be stopped a minimum of 10 days prior to surgery:  All over the counter, Supplements & Herbal medications    Anorectics: Phentermine (Adipex-P, Lomaira and Suprenza), Phentermine-topiramate (Qsymia), Bupropion-naltrexone (Contrave)    Opiod Partial Agonists/Opioid Antagonists: Buprenorphine (Subocone, Belbuca, Butrans, Probuphine Implant, Sublocade), Naltrexone (ReVia, Vivitrol), Naloxone    Amphetamines: Dextroamphetamine/Amphetamine (Adderall, Mydayis), Methylphenidate Hydrochloride (Concerta, Metadate, Methylin, Ritalin)    The following medications should be stopped 5 days prior to surgery:  Blood Thinners: Any Aspirin, Aspirin products, anti-inflammatories such as ibuprofen and any blood thinners such as Coumadin and Plavix. Please consult your prescribing physician if you are on life saving blood thinners, in regards to when to stop medications prior to surgery.      The following medications should be stopped a minimum of 3 days prior to surgery:  PDE-5 inhibitors: Sildenafil (Viagra), Tadalafil (Cialis), Vardenafil (Levitra), Avanafil (Stendra)    MAO Inhibitors: Rasagiline (Azilect), Selegiline (Eldepryl, Emsam, Selapar), Isocarboxazid (Marplan), Phenelzine (Nardil)         COVID and INFLUENZA NOTICE TO PATIENTS    Currently, the [facility] does not routinely test patients for COVID-19 or Influenza prior to their elective surgery.  However, if patients develop the following symptoms prior to their surgery date, they should voluntarily test for COVID-19 and Influenza and notify the surgical office of their condition and results.  The symptoms warranting testing would be any two of the following:    Fever (Temp above 100.4 F)  Chills  Cough  Shortness of breath or difficulty breathing  Fatigue  Myalgias (muscle or body aches)  Headache  Sore Throat  Congestion or Runny Nose  Nausea or vomiting  Diarrhea  New loss of taste or smell    Having these symptoms prior to surgery can significantly increase your risk of morbidity and mortality under anesthesia, which may compromise your health and require a transfer to a hospital for a higher level of care.  Therefore, it is advisable to notify the surgical team of any illness in order to get information for the appropriate time to delay the surgery to minimize these preventable risks.    Your health and safety are our number one priority at the [facility], and we are thankful that you entrust us with your care.  Please help us ensure the best possible surgical and anesthetic outcome by sharing appropriate health information with our perioperative team and staff.  We look forward to taking great care of you!    Thank you for your time and consideration on this matter.    Rahul Ozuna MD  Medical Director  Anesthesiologist  [facility]

## 2023-05-02 NOTE — DISCHARGE INSTRUCTIONS
HOME CARE INSTRUCTIONS    ACTIVITY: Rest and take it easy for the first 24 hours.  A responsible adult is recommended to remain with you during that time.  It is normal to feel sleepy.  We encourage you to not do anything that requires balance, judgment or coordination.    FOR 24 HOURS DO NOT:  Drive, operate machinery or run household appliances.  Drink beer or alcoholic beverages.  Make important decisions or sign legal documents.    SPECIAL INSTRUCTIONS:   Weight bearing as tolerated left lower extremity in Boot   Boot when walking and sleeping   Start physical therapy this week or next week   Ice and elevate   Stay ahead of pain   Keep dressing clean and dry   Aspirin 81mg two times a day for clot prevention  Refer to [facility] packet for further instructions    DIET: To avoid nausea, slowly advance diet as tolerated, avoiding spicy or greasy foods for the first day.  Add more substantial food to your diet according to your physician's instructions.  Babies can be fed formula or breast milk as soon as they are hungry.  INCREASE FLUIDS AND FIBER TO AVOID CONSTIPATION.    MEDICATIONS: Resume taking daily medication.  Take prescribed pain medication with food.  If no medication is prescribed, you may take non-aspirin pain medication if needed.  PAIN MEDICATION CAN BE VERY CONSTIPATING.  Take a stool softener or laxative such as senokot, pericolace, or milk of magnesia if needed.    Prescription given for __.  Last pain medication given at __.    A follow-up appointment should be arranged with your doctor; call to schedule.    You should CALL YOUR PHYSICIAN if you develop:  Fever greater than 101 degrees F.  Pain not relieved by medication, or persistent nausea or vomiting.  Excessive bleeding (blood soaking through dressing) or unexpected drainage from the wound.  Extreme redness or swelling around the incision site, drainage of pus or foul smelling drainage.  Inability to urinate or empty your bladder within 8  hours.  Problems with breathing or chest pain.    You should call 911 if you develop problems with breathing or chest pain.  If you are unable to contact your doctor or surgical center, you should go to the nearest emergency room or urgent care center.  Physician's telephone #: [phone]    MILD FLU-LIKE SYMPTOMS ARE NORMAL.  YOU MAY EXPERIENCE GENERALIZED MUSCLE ACHES, THROAT IRRITATION, HEADACHE AND/OR SOME NAUSEA.    If any questions arise, call your doctor.  If your doctor is not available, please feel free to call the Surgical Center at [phone].  The Center is open Monday through Friday from 7AM to 7PM.      A registered nurse may call you a few days after your surgery to see how you are doing after your procedure.    You may also receive a survey in the mail within the next two weeks and we ask that you take a few moments to complete the survey and return it to us.  Our goal is to provide you with very good care and we value your comments.     Depression / Suicide Risk    As you are discharged from this [facility] facility, it is important to learn how to keep safe from harming yourself.    Recognize the warning signs:  Abrupt changes in personality, positive or negative- including increase in energy   Giving away possessions  Change in eating patterns- significant weight changes-  positive or negative  Change in sleeping patterns- unable to sleep or sleeping all the time   Unwillingness or inability to communicate  Depression  Unusual sadness, discouragement and loneliness  Talk of wanting to die  Neglect of personal appearance   Rebelliousness- reckless behavior  Withdrawal from people/activities they love  Confusion- inability to concentrate     If you or a loved one observes any of these behaviors or has concerns about self-harm, here's what you can do:  Talk about it- your feelings and reasons for harming yourself  Remove any means that you might use to hurt yourself (examples: pills, rope,  extension cords, firearm)  Get professional help from the community (Mental Health, Substance Abuse, psychological counseling)  Do not be alone:Call your Safe Contact- someone whom you trust who will be there for you.  Call your local CRISIS HOTLINE 592-9284 or [phone]  Call your local Children's Mobile Crisis Response Team [place] (917) 156-7970 or www."2,10E+07"  Call the toll free National Suicide Prevention Hotlines   National Suicide Prevention Lifeline 248-282-SEKR (7276)  [facility] Line Network 800-SUICIDE (303-6733)    I acknowledge receipt and understanding of these Home Care instructions.

## 2023-05-02 NOTE — ANESTHESIA PROCEDURE NOTES
Peripheral Block    Date/Time: 5/2/2023 7:06 AM  Performed by: Freddy Waller D.O.  Authorized by: Freddy Waller D.O.     Patient Location:  OR  Start Time:  5/2/2023 7:06 AM  End Time:  5/2/2023 7:08 AM  Reason for Block: at surgeon's request and post-op pain management ONLY    patient identified, IV checked, site marked, risks and benefits discussed, surgical consent, monitors and equipment checked, pre-op evaluation and timeout performed    Patient Position:  Supine  Prep: ChloraPrep    Monitoring:  Heart rate, continuous pulse ox and cardiac monitor  Block Region:  Lower Extremity  Lower Extremity - Block Type:  Selective FEMORAL nerve block at the Adductor Canal    Laterality:  Left  Procedures: ultrasound guided  Image captured, interpreted and electronically stored.  Block Type:  Single-shot  Needle Length:  100mm  Needle Gauge:  21 G  Needle Localization:  Ultrasound guidance  Injection Assessment:  Negative aspiration for heme, no paresthesia on injection, incremental injection and local visualized surrounding nerve on ultrasound  Evidence of intravascular injection: No     US Guided Selective Femoral Nerve Block at Adductor Canal:   US probe placed at mid-thigh level on externally rotated leg and femur identified.  Probe directed medially until Sartorius Muscle (SM), Femoral Artery (FA) and Saphenous Nerve (SN) identified in Adductor Canal (AC).  Needle inserted anterolateral to probe in an in plane approach into a subsartorial perivascular perineural position.  After negative aspiration LA injected with ease and visualized spreading within the AC.

## 2023-05-02 NOTE — OR NURSING
Report called to Alyssa ESPARZA. Plan of care discussed. Patient denies pain or nausea. VSS. Nerve blocks in place. Boot in place.

## 2023-05-02 NOTE — ANESTHESIA PREPROCEDURE EVALUATION
Case: [id number] Date/Time: 05/02/23 0645    Procedures:       LEFT OPEN REDUCTION INTERNAL FIXATION TRIMALLEOLAR EQUIVALENT, SYNDESMOSIS REPAIR, ANKLE ARTHROSCOPY (Left)      ARTHROSCOPY, ANKLE    Diagnosis: Trimalleolar fracture of ankle, closed, left, with routine healing, subsequent encounter [S82.628S]    Pre-op diagnosis: Trimalleolar fracture of ankle, closed, left, with routine healing, subsequent encounter [S82.262W]    Location: [place] [address] / SURGERY [facility]    Surgeons: Diego Mahmood M.D.          Relevant Problems   Other   (positive) Trimalleolar fracture of ankle, closed, left, with routine healing, subsequent encounter       Physical Exam    Airway   Mallampati: II  TM distance: >3 FB  Neck ROM: full       Cardiovascular - normal exam  Rhythm: regular  Rate: normal  (-) murmur     Dental - normal exam           Pulmonary - normal exam  Breath sounds clear to auscultation     Abdominal    Neurological - normal exam                 Anesthesia Plan    ASA 1       Plan - general and peripheral nerve block     Peripheral nerve block will be post-op pain control  Airway plan will be LMA          Induction: intravenous    Postoperative Plan: Postoperative administration of opioids is intended.    Pertinent diagnostic labs and testing reviewed    Informed Consent:    Anesthetic plan and risks discussed with patient.    Use of blood products discussed with: patient whom consented to blood products.

## 2023-05-02 NOTE — ANESTHESIA PROCEDURE NOTES
Peripheral Block    Date/Time: 5/2/2023 7:02 AM  Performed by: Freddy Waller D.O.  Authorized by: Freddy Waller D.O.     Patient Location:  OR  Start Time:  5/2/2023 7:02 AM  End Time:  5/2/2023 7:06 AM  Reason for Block: at surgeon's request and post-op pain management ONLY    patient identified, IV checked, site marked, risks and benefits discussed, surgical consent, monitors and equipment checked, pre-op evaluation and timeout performed    Patient Position:  Supine  Prep: ChloraPrep    Monitoring:  Heart rate, continuous pulse ox and cardiac monitor  Block Region:  Lower Extremity  Lower Extremity - Block Type:  SCIATIC nerve block, lateral approach    Laterality:  Left  Procedures: ultrasound guided  Image captured, interpreted and electronically stored.  Block Type:  Single-shot  Needle Length:  100mm  Needle Gauge:  21 G  Needle Localization:  Ultrasound guidance  Injection Assessment:  Negative aspiration for heme, no paresthesia on injection, incremental injection and local visualized surrounding nerve on ultrasound  Evidence of intravascular injection: No     US Guided Sciatic Nerve Block   US probe placed several cm proximal to popliteal crease on posterior thigh and scanned caudad and cephalad until Sciatic Nerve (SN) identified superficial/lateral to popliteal artery.  Needle inserted lateral to probe in an in plane approach under direct visualization to a perineural position.  After negative aspiration LA injected with ease and visualized surrounding the SN.

## 2023-05-02 NOTE — OP REPORT
DATE OF SERVICE:  05/02/2023     PREOPERATIVE DIAGNOSES:  1.  Left trimalleolar ankle fracture dislocation.  2.  Left syndesmotic disruption.  3.  Left acute ankle pain.     POSTOPERATIVE DIAGNOSES:  1.  Left trimalleolar ankle fracture dislocation.  2.  Left syndesmotic disruption.  3.  Left acute ankle pain.  4.  Left central medial talar dome osteochondral defect.     PROCEDURES:   1.  Left ankle arthroscopy with extensive debridement.  2.  Left ankle arthroscopy with drilling of a talar osteochondral defect and   removal of large osteochondral fragments.  3.  Open reduction and internal fixation, left trimalleolar equivalent ankle   fracture without fixation of posterior lip.  4.  Open reduction and internal fixation, left syndesmosis.     SURGEON:  Diego Mahmood MD     ASSISTANT:  MARYJANE Cisneros     ANESTHESIA:  General with peripheral nerve block requested by me for   postoperative pain control.     ESTIMATED BLOOD LOSS:  10 mL.     TOURNIQUET TIME:  38 minutes at 250 mmHg.     IMPLANTS:  [employer] SynchFix syndesmotic fixation device.     COMPLICATIONS:  None.     OUTCOME:  PACU in stable condition.     HISTORY OF PRESENT ILLNESS:  This is a pleasant 38-year-old gentleman who   sustained the above stated injury while playing soccer.  He had a trimalleolar   equivalent ankle fracture with a proximal fibula, a posterior malleolar   fragment and a significant medial instability.  He was indicated for the   above-stated procedure, greeted in the preoperative holding area and   identified by name and medical record number.  Left lower extremity was   marked.  Risks of procedure including bleeding, infection, pain, malunion,   nonunion, neurovascular damage, posttraumatic arthritis, and need for more   surgery were discussed and he provided written consent.     DESCRIPTION OF PROCEDURE:  He was taken to the operating room and placed on   table in supine position.  Preoperative antibiotics were  administered.    General anesthesia was induced.  Nonsterile tourniquet was placed on his left   thigh.  Left lower extremity prepped and draped in the usual sterile fashion.    Operative pause was taken where all present were in agreement with the   patient identification and laterality, and procedure to be performed.  The   limb was elevated for 5 minutes, tourniquet raised to 250 mmHg.  Joint was   insufflated with 10 mL sterile normal saline.  A medial portal was made at the   level of joint just medial to the tibialis anterior tendon.  Immediately seen   was extensive hematoma within the joint, damage ligament and osteochondral   fragments.  A lateral portal was established followed by 3.5 mm shaver.  We   spent a significant amount of time clearing out the joint, removing some   overhanging and potentially impinging portions of the AITFL as well as the   deltoid, which was avulsed.  We evaluated the entirety of the joint surface   and we noted in the medial gutter there was a large piece of cartilage.  This   was removed with a grasper.  The cartilage come from the central medial talar   dome lesion now that measured about 10 mm from medial to lateral and 8 mm from   anterior to posterior and that the pattern was somewhat irregular.  We   brought this back to stable borders with arthroscopic curettes, We used a   microfracture awl to make multiple microfracture holes within the defect.  We   then used the shaver to remove any additional osteochondral fragments.  We   noted marked syndesmotic instability.  We then withdrew arthroscopic   instruments, we were able to hold an anatomic reduction of the ankle mortise   in AP, oblique, and lateral planes.  We then checked radiographically the   proximal fibula fracture and maintained length and rotation.  The posterior   malleolar fragment was at appropriate length.  We made 2 cm longitudinal   incision laterally over the fibula at the level of the syndesmosis.  We    visually and radiographically reduce the syndesmosis while we drilled for and   placed a SynchFix making a small incision medially for the medial plate.  On   tightening of this under direct radiographic guidance, we maintained an   anatomic reduction of the ankle mortise.  We then checked the posterior   malleolar fragment and maintained an anatomic reduction of this so, I did not   feel additional fixation was necessary.  We checked the proximal fibula, which   maintained its length and rotation appropriately without additional fixation.    We performed an external rotation stress test and did not note inappropriate   medial clear space widening, so I did not feel that the deltoid needed to be   fixed separately.  We then reintroduced the arthroscope and removed additional   impinging tissue.  We noted now that the medial clear space and the   syndesmosis were anatomically reduced and stable, arthroscopic instruments   were withdrawn.  The tourniquet was let down.  Hemostasis was achieved.    Incisions copiously irrigated.  Each incision was closed with 3-0 nylon in   horizontal mattress fashion.  Adaptic gauze and a compressive wrap were   placed.  He was placed back in his Cam walker boot, awakened and extubated in   stable condition.     POSTOPERATIVE COURSE:  He will discharge home today assuming adequate pain   control, mobilization, weightbearing as tolerated in a Cam walker boot.  I   would like to start therapy later this week or early next week.  He will be in   the boot for approximately 6 weeks depending on healing.        ______________________________  MD SABINO PRESCOTT/TANYA/JOE    DD:  05/02/2023 08:16  DT:  05/02/2023 09:07    Job#:  [id number]

## 2023-05-02 NOTE — ANESTHESIA POSTPROCEDURE EVALUATION
Patient: Min Byrnes    Procedure Summary     Date: 05/02/23 Room / Location: [place] [address] / SURGERY [facility]    Anesthesia Start: 0657 Anesthesia Stop: 0807    Procedures:       LEFT OPEN REDUCTION INTERNAL FIXATION TRIMALLEOLAR EQUIVALENT, SYNDESMOSIS REPAIR, ANKLE ARTHROSCOPY (Left: Ankle)      ARTHROSCOPY, ANKLE (Left: Ankle) Diagnosis:       Trimalleolar fracture of ankle, closed, left, with routine healing, subsequent encounter      (Trimalleolar fracture of ankle, closed, left)    Surgeons: Diego Mahmood M.D. Responsible Provider: Freddy Waller D.O.    Anesthesia Type: general, peripheral nerve block ASA Status: 1          Final Anesthesia Type: general, peripheral nerve block  Last vitals  BP   Blood Pressure: 114/62    Temp   36.3 °C (97.4 °F)    Pulse   90   Resp   18    SpO2   92 %      Anesthesia Post Evaluation    Patient location during evaluation: PACU  Patient participation: complete - patient participated  Level of consciousness: awake and alert  Pain score: 0    Airway patency: patent  Anesthetic complications: no  Cardiovascular status: hemodynamically stable  Respiratory status: acceptable  Hydration status: euvolemic    PONV: none          No notable events documented.     Nurse Pain Score: 0 (NPRS)

## (undated) DEVICE — WRAP CO-FLEX 4IN X 5YD STERIL - SELF-ADHERENT (18/CA)

## (undated) DEVICE — LACTATED RINGERS INJ 1000 ML - (14EA/CA 60CA/PF)

## (undated) DEVICE — GLOVE BIOGEL INDICATOR SZ 8.5 SURGICAL PF LTX - (50/BX 4BX/CA)

## (undated) DEVICE — GLOVE BIOGEL SZ 8 SURGICAL PF LTX - (50PR/BX 4BX/CA)

## (undated) DEVICE — SLEEVE, VASO, THIGH, MED

## (undated) DEVICE — NEEDLE W/FACET S TIP ECHOGENIC W/STIMULATION CABLE SONOPLEX II 21G X 4IN (10EA/BX)

## (undated) DEVICE — TOURNIQUET CUFF 34 X 4 ONE PORT DISP - STERILE (10/BX)

## (undated) DEVICE — SUTURE GENERAL

## (undated) DEVICE — PADDING CAST 6 IN STERILE - 6 X 4 YDS (24/CA)

## (undated) DEVICE — CHLORAPREP 26 ML APPLICATOR - ORANGE TINT(25/CA)

## (undated) DEVICE — TOWEL STOP TIMEOUT SAFETY FLAG (40EA/CA)

## (undated) DEVICE — GOWN WARMING STANDARD FLEX - (30/CA)

## (undated) DEVICE — GLOVE SZ 7 BIOGEL PI MICRO - PF LF (50PR/BX 4BX/CA)

## (undated) DEVICE — SENSOR OXIMETER ADULT SPO2 RD SET (20EA/BX)

## (undated) DEVICE — SODIUM CHL. IRRIGATION 0.9% 3000ML (4EA/CA 65CA/PF)

## (undated) DEVICE — GLOVE BIOGEL PI ORTHO SZ 7.5 PF LF (40PR/BX)

## (undated) DEVICE — DRESSING ABDOMINAL PAD STERILE 8 X 10" (360EA/CA)"

## (undated) DEVICE — PACK LOWER EXTREMITY - (2/CA)

## (undated) DEVICE — TUBING DAY USE W/CARTRIDGE (10EA/BX)

## (undated) DEVICE — SUCTION INSTRUMENT YANKAUER BULBOUS TIP W/O VENT (50EA/CA)

## (undated) DEVICE — BANDAGE ELASTIC 6 HONEYCOMB - 6X5YD LF (20/CA)"

## (undated) DEVICE — ELECTRODE DUAL RETURN W/ CORD - (50/PK)

## (undated) DEVICE — TUBING CASSETTE CROSSFLOW INTEGRATED (10EA/CA)

## (undated) DEVICE — SET EXTENSION WITH 2 PORTS (48EA/CA) ***PART #2C8610 IS A SUBSTITUTE*****

## (undated) DEVICE — SHAVER4.0 AGGRESSIVE + FORMLA (5EA/BX)

## (undated) DEVICE — CANISTER SUCTION 3000ML MECHANICAL FILTER AUTO SHUTOFF MEDI-VAC NONSTERILE LF DISP  (40EA/CA)

## (undated) DEVICE — DRAPE 36X28IN RAD CARM BND BG - (25/CA) O

## (undated) DEVICE — NEEDLE SAFETY 18 GA X 1 1/2 IN (100EA/BX)

## (undated) DEVICE — SET LEADWIRE 5 LEAD BEDSIDE DISPOSABLE ECG (1SET OF 5/EA)

## (undated) DEVICE — DRESSING 3X3 ADAPTIC GAUZE - (50EA/CT)

## (undated) DEVICE — BLADE SURGICAL #15 - (50/BX 3BX/CA)

## (undated) DEVICE — SUTURE 3-0 MONOCRYL PLUS PS-1 - 27 INCH (36/BX)

## (undated) DEVICE — TUBING CLEARLINK DUO-VENT - C-FLO (48EA/CA)

## (undated) DEVICE — DRAPE LARGE 3 QUARTER - (20/CA)

## (undated) DEVICE — PAD LAP STERILE 18 X 18 - (5/PK 40PK/CA)

## (undated) DEVICE — COVER LIGHT HANDLE ALC PLUS DISP (18EA/BX)

## (undated) DEVICE — GLOVE BIOGEL PI INDICATOR SZ 7.0 SURGICAL PF LF - (50/BX 4BX/CA)

## (undated) DEVICE — SUTURE 3-0 ETHILON PS-1 (36PK/BX)

## (undated) DEVICE — SODIUM CHL IRRIGATION 0.9% 1000ML (12EA/CA)